# Patient Record
Sex: FEMALE | Race: BLACK OR AFRICAN AMERICAN | NOT HISPANIC OR LATINO | Employment: UNEMPLOYED | URBAN - METROPOLITAN AREA
[De-identification: names, ages, dates, MRNs, and addresses within clinical notes are randomized per-mention and may not be internally consistent; named-entity substitution may affect disease eponyms.]

---

## 2019-11-06 PROBLEM — Q28.2 AVM (ARTERIOVENOUS MALFORMATION) BRAIN: Status: ACTIVE | Noted: 2019-11-06

## 2019-11-06 PROBLEM — Z98.2 STATUS POST VENTRICULO-PERITONEAL SHUNT PLACEMENT: Status: ACTIVE | Noted: 2019-11-06

## 2021-10-25 ENCOUNTER — PATIENT OUTREACH (OUTPATIENT)
Dept: ADMINISTRATIVE | Facility: HOSPITAL | Age: 61
End: 2021-10-25

## 2021-10-25 DIAGNOSIS — Z12.12 ENCOUNTER FOR COLORECTAL CANCER SCREENING: Primary | ICD-10-CM

## 2021-10-25 DIAGNOSIS — Z12.31 ENCOUNTER FOR MAMMOGRAM TO ESTABLISH BASELINE MAMMOGRAM: ICD-10-CM

## 2021-10-25 DIAGNOSIS — Z13.6 ENCOUNTER FOR LIPID SCREENING FOR CARDIOVASCULAR DISEASE: ICD-10-CM

## 2021-10-25 DIAGNOSIS — Z13.220 ENCOUNTER FOR LIPID SCREENING FOR CARDIOVASCULAR DISEASE: ICD-10-CM

## 2021-10-25 DIAGNOSIS — Z12.4 ENCOUNTER FOR PAP SMEAR OF CERVIX WITH HPV DNA COTESTING: ICD-10-CM

## 2021-10-25 DIAGNOSIS — Z12.11 ENCOUNTER FOR COLORECTAL CANCER SCREENING: Primary | ICD-10-CM

## 2022-04-14 PROBLEM — R63.4 WEIGHT LOSS, UNINTENTIONAL: Status: ACTIVE | Noted: 2022-04-14

## 2022-04-14 PROBLEM — H91.91 HEARING LOSS OF RIGHT EAR: Status: ACTIVE | Noted: 2022-04-14

## 2022-04-14 PROBLEM — I10 HYPERTENSION: Status: ACTIVE | Noted: 2022-04-14

## 2022-05-05 PROBLEM — B18.2 CHRONIC HEPATITIS C WITHOUT HEPATIC COMA: Status: ACTIVE | Noted: 2022-05-05

## 2022-06-01 ENCOUNTER — SPECIALTY PHARMACY (OUTPATIENT)
Dept: PHARMACY | Facility: CLINIC | Age: 62
End: 2022-06-01

## 2022-06-01 NOTE — TELEPHONE ENCOUNTER
[facility] test claim [facility] - Product/Service not covered, plan/benefit exclusion.   BRAND Epclusa test claim ([facility] - [facility] - Product/Service not covered, plan/benefit exclusion.     AG Epclusa PA submitted via CMM at 12:20 PM (Key: [id number] - PA Case ID: [id number]).     Patient also has secondary coverage (Medicaid - [facility]).

## 2022-06-02 NOTE — TELEPHONE ENCOUNTER
PA forms received via fax. Forms completed and faxed back to [phone] with chart notes and lab work. Fax receipt received at 4:54 PM.

## 2022-06-03 NOTE — TELEPHONE ENCOUNTER
Approval letter received. Letter states Epclusa 400-100 mg tablet is approved from 5/3/22-8/25/22.     AG Epclusa test claim - Product/Service not covered, plan/benefit exclusion.   BRAND Epclusa test claim (DAW9) - DAW9 not supported under patient's plan.   BRAND Epclusa test claim (DAW1) - Product/Service not covered, plan/benefit exclusion.     Called [facility] and finally got in touch with ANDREE Mitchell). She is able to get a paid claim for BRAND Epclusa at [facility] with DAW2 (patient requested) and DAW0 (no product selection indicated). She could not confirm copay amount and she could not confirm if patient would be penalized if pharmacy would use DAW2.     Forwarded Rx for benefits investigation. Need to make sure pharmacy is able to bill [place] medicaid plan as secondary.

## 2022-06-07 NOTE — TELEPHONE ENCOUNTER
Benefit Investigation (for [employer] [facility])     Drug Name:Epclusa (Brand) Will not cover Generic even with PA.   Insurance per (Rana)   Deductible: $50 ($0 Accumulated)    Max OOP: $700 ( $0 Accumulated)    [facility] is OUT of Net work. [facility] is mandated [phone]   Estimated copay $700 Before ded is met with [facility] ($95 Brand after Ded is Met)    RX [facility] showed they will pay as secondary plan with PA for Brand Name.

## 2022-06-08 NOTE — TELEPHONE ENCOUNTER
BRAND Epclusa PA submitted to secondary plan ([place] Medicaid) (Key: [id number] - PA Case ID: [id number]).     Primary plan will only approve BRAND Epclusa.

## 2022-06-09 NOTE — TELEPHONE ENCOUNTER
Medicaid denied BRAND Epclusa PA since patient did not previously try and fail preferred product (AG Epclusa). Primary plan will NOT cover AG Epclusa - will only cover BRAND.     Marbin gutierres) scheduled [id number] for 6/10/22 between 10 AM-12 PM.

## 2022-06-10 NOTE — TELEPHONE ENCOUNTER
Incoming call from [id number] dept from secondary plan. PREM Oh has been approved for 12 weeks of treatment from 6/10/22-9/2/22. Tracking ID: [id number].     [facility] [address] - Phone: [phone] due to primary insurance plan requirements.     Contacted patient to discuss the above. [facility] contact information given to patient. [facility]. No other questions or concerns at this time.

## 2022-10-21 PROBLEM — H90.11 CONDUCTIVE HEARING LOSS OF RIGHT EAR: Status: ACTIVE | Noted: 2022-04-14

## 2023-01-17 ENCOUNTER — PATIENT OUTREACH (OUTPATIENT)
Dept: ADMINISTRATIVE | Facility: HOSPITAL | Age: 63
End: 2023-01-17

## 2023-01-19 PROBLEM — Z00.00 HEALTH CARE MAINTENANCE: Status: ACTIVE | Noted: 2023-01-19

## 2023-01-31 PROBLEM — Q27.30 AVM (ARTERIOVENOUS MALFORMATION): Status: ACTIVE | Noted: 2023-01-31

## 2023-04-11 PROBLEM — B18.2 CHRONIC HEPATITIS C WITHOUT HEPATIC COMA: Status: RESOLVED | Noted: 2022-05-05 | Resolved: 2023-04-11

## 2023-04-24 PROBLEM — Z00.00 HEALTH CARE MAINTENANCE: Status: RESOLVED | Noted: 2023-01-19 | Resolved: 2023-04-24

## 2023-07-19 PROBLEM — I72.9 ANEURYSM: Status: ACTIVE | Noted: 2018-11-03

## 2023-07-24 PROBLEM — E75.5 XANTHOMA: Status: ACTIVE | Noted: 2023-07-24

## 2023-07-24 PROBLEM — F17.210 SMOKING GREATER THAN 25 PACK YEARS: Status: ACTIVE | Noted: 2023-07-24

## 2023-08-17 PROBLEM — Z87.891 PERSONAL HISTORY OF TOBACCO USE, PRESENTING HAZARDS TO HEALTH: Status: ACTIVE | Noted: 2023-08-17

## 2023-10-23 PROBLEM — Z00.00 HEALTHCARE MAINTENANCE: Status: RESOLVED | Noted: 2023-01-19 | Resolved: 2023-10-23

## 2024-02-09 PROBLEM — R10.11 RIGHT UPPER QUADRANT PAIN: Status: ACTIVE | Noted: 2024-02-09

## 2024-06-03 ENCOUNTER — PATIENT OUTREACH (OUTPATIENT)
Dept: ADMINISTRATIVE | Facility: HOSPITAL | Age: 64
End: 2024-06-03

## 2024-09-27 DIAGNOSIS — Z12.31 OTHER SCREENING MAMMOGRAM: ICD-10-CM

## 2025-03-26 ENCOUNTER — TELEPHONE (OUTPATIENT)
Dept: ADMINISTRATIVE | Facility: HOSPITAL | Age: 65
End: 2025-03-26